# Patient Record
(demographics unavailable — no encounter records)

---

## 2024-12-10 NOTE — HISTORY OF PRESENT ILLNESS
[No falls in past year] : Patient reported no falls in the past year [Full assistance needed] : Assistance needed managing medications [0] : 2) Feeling down, depressed, or hopeless: Not at all (0) [PHQ-2 Negative - No further assessment needed] : PHQ-2 Negative - No further assessment needed [I have developed a follow-up plan documented below in the note.] : I have developed a follow-up plan documented below in the note. [Moderate] : Stage: Moderate [Patient Observed To Be Agitated] : denies agitation [Hostility Toward Caregivers] : denies aggression [] : denies wandering [Fixed Beliefs Contradicted By Reality (Delusions)] : denies delusions [With Patient/Caregiver] : , with patient/caregiver [Designated Healthcare Proxy] : Designated healthcare proxy [Time Spent: ___ minutes] : Time Spent: [unfilled] minutes [FreeTextEntry1] : Care team:  PCP Dr. Dagmar Ayers GI:   Nephrology: Dr  82 year old female, hx of DM, HTN, HLD, PAD, recent history of diverticulosis, d, presenting to geriatrics clinic for geriatric consultation with concern for cognitive decline, now for formal cognitive evaluation.  Patient is Croatian speaking, she prefers family for translation, family assisted with history given patients forgetfulness. Daughter Lokesh and son maya present.    hospitalized in august for abdominal pain, nausea, and inability to tolerate PO, found to have diverticulitis and gastritis with small hiatal hernia seen on endoscopy. Negative for H. Pylori. Discharged 8/27/2024 from [facility]. Has since followed with PCP Dr. Dagmar Ayers, GI, nephrology.   She is taking medications as prescribed. Refills sent today. GI increased Sulfacrate from BID to 4 times daily and started Simethicone 2-3 times daily prn. She endorses her GI symptoms are improving.   Weight has stablized and mood and sleep have improved with mirtazipine. Patient endorses better sleep and therefore better mood.   Social hx: Lives with daughter. Insurance does not cover PT. Ambulates with cane.   Support structures: Lives with  and son. 8 children, 5 currently living. 2 in [facility], 1 in [place], 1 son and 1 daughter in [place] HHA through [employer] with daughter as HHA [[id number]] : 0 [CornellScale] : 27 on 11/13/24 [AdvancecareDate] : 11/24 [FreeTextEntry4] : Patient assign her daughter Lokesh Brown ([phone]) as primary and son Christos Mancia  ([phone])

## 2024-12-10 NOTE — PHYSICAL EXAM
[Alert] : alert [Sclera] : the sclera and conjunctiva were normal [EOMI] : extraocular movements were intact [Normal Oral Mucosa] : normal oral mucosa [No Oral Pallor] : no oral pallor [Normal Outer Ear/Nose] : the ears and nose were normal in appearance [Normal Appearance] : the appearance of the neck was normal [No Respiratory Distress] : no respiratory distress [No Acc Muscle Use] : no accessory muscle use [Respiration, Rhythm And Depth] : normal respiratory rhythm and effort [Auscultation Breath Sounds / Voice Sounds] : lungs were clear to auscultation bilaterally [Normal S1, S2] : normal S1 and S2 [Heart Rate And Rhythm] : heart rate was normal and rhythm regular [Normal] : normal bowel sounds, non-tender [Bowel Sounds] : normal bowel sounds [Abdomen Tenderness] : non-tender [Abdomen Soft] : soft [No Clubbing, Cyanosis] : no clubbing or cyanosis of the fingernails [No Joint Swelling] : no joint swelling seen [Normal Color / Pigmentation] : normal skin color and pigmentation [] : no rash [Normal Turgor] : normal skin turgor [Skin Lesions] : no skin lesions [No Focal Deficits] : no focal deficits [___ / 5] : Visuospatial / Executive: [unfilled] / 5 [___ / 3] : Attention (Serial 7 subtraction): [unfilled] / 3 [___ / 1] : Fluency: [unfilled] / 1 [___ / 2] : Abstraction: [unfilled] / 2 [___ / 5] : Delayed Recall: [unfilled] / 5 [___ / 6] : Orientation: [unfilled] / 6 [MocaTotal] : 8 [FreeTextEntry1] : 11/13/24

## 2024-12-10 NOTE — REVIEW OF SYSTEMS
[As Noted in HPI] : as noted in HPI [Emotional Problems] : emotional problems [Negative] : Heme/Lymph

## 2024-12-10 NOTE — ASSESSMENT
[FreeTextEntry1] : 1) Unspecified dementia, moderate, with mood disturbance: There is concern that hx medication noncompliance may be manifestation of cognitive decline. Daughter shared with team during the appt that patient has been forgetful.  Requires considerable assistance with ADLs and iADLs. MOCA  on 11/13/2024 - 8/30 deficits in multiple cognitive domains, including short term recall. concerning for moderate to severe cognitive impairment.  - FAST score 6D, patient has urinary incontinence.  - [place] Depression scale 27 initialy, on 12/10 now 24. C/w mirtazipine daily for clinical depression.  - Neurology records to be faxed over, including past brain imaging for tremors. F/u with neuro for consideration of donepezil memantine.   2) HTN C/w amlodipine and metoprolol. monitor bp at home.   Follow up in 3 months to assess a1c and hgb.   Rest per PMD/Care team: Patient is here for episodic care. All patient questions answered today and understood by patient. Henceforth, Patient to schedule follow up if new symptoms, questions, renewals or health concerns.